# Patient Record
Sex: FEMALE
[De-identification: names, ages, dates, MRNs, and addresses within clinical notes are randomized per-mention and may not be internally consistent; named-entity substitution may affect disease eponyms.]

---

## 2018-10-29 PROBLEM — Z00.00 ENCOUNTER FOR PREVENTIVE HEALTH EXAMINATION: Status: ACTIVE | Noted: 2018-10-29

## 2019-01-10 ENCOUNTER — APPOINTMENT (OUTPATIENT)
Dept: ANTEPARTUM | Facility: CLINIC | Age: 29
End: 2019-01-10

## 2019-01-10 ENCOUNTER — ASOB RESULT (OUTPATIENT)
Age: 29
End: 2019-01-10

## 2019-01-10 ENCOUNTER — APPOINTMENT (OUTPATIENT)
Dept: OBGYN | Facility: CLINIC | Age: 29
End: 2019-01-10
Payer: OTHER GOVERNMENT

## 2019-01-10 PROCEDURE — 76805 OB US >/= 14 WKS SNGL FETUS: CPT

## 2019-03-21 ENCOUNTER — ASOB RESULT (OUTPATIENT)
Age: 29
End: 2019-03-21

## 2019-03-21 ENCOUNTER — APPOINTMENT (OUTPATIENT)
Dept: OBGYN | Facility: CLINIC | Age: 29
End: 2019-03-21
Payer: OTHER GOVERNMENT

## 2019-03-21 PROCEDURE — 76816 OB US FOLLOW-UP PER FETUS: CPT

## 2019-04-25 ENCOUNTER — APPOINTMENT (OUTPATIENT)
Dept: OBGYN | Facility: CLINIC | Age: 29
End: 2019-04-25
Payer: OTHER GOVERNMENT

## 2019-04-25 ENCOUNTER — ASOB RESULT (OUTPATIENT)
Age: 29
End: 2019-04-25

## 2019-04-25 PROCEDURE — 76816 OB US FOLLOW-UP PER FETUS: CPT

## 2019-04-29 ENCOUNTER — APPOINTMENT (OUTPATIENT)
Dept: MATERNAL FETAL MEDICINE | Facility: CLINIC | Age: 29
End: 2019-04-29
Payer: OTHER GOVERNMENT

## 2019-04-29 VITALS
SYSTOLIC BLOOD PRESSURE: 94 MMHG | HEIGHT: 61 IN | BODY MASS INDEX: 28.32 KG/M2 | WEIGHT: 150 LBS | DIASTOLIC BLOOD PRESSURE: 68 MMHG | HEART RATE: 70 BPM

## 2019-04-29 DIAGNOSIS — O43.199 OTHER MALFORMATION OF PLACENTA, UNSPECIFIED TRIMESTER: ICD-10-CM

## 2019-04-29 DIAGNOSIS — O99.013 ANEMIA COMPLICATING PREGNANCY, THIRD TRIMESTER: ICD-10-CM

## 2019-04-29 DIAGNOSIS — Z78.9 OTHER SPECIFIED HEALTH STATUS: ICD-10-CM

## 2019-04-29 PROCEDURE — 99242 OFF/OP CONSLTJ NEW/EST SF 20: CPT

## 2019-04-29 RX ORDER — FERROUS SULFATE 325(65) MG
325 (65 FE) TABLET ORAL DAILY
Refills: 0 | Status: ACTIVE | COMMUNITY
Start: 2019-04-29

## 2019-04-29 NOTE — DISCUSSION/SUMMARY
[FreeTextEntry1] : Kathy is a 29-year-old  at 38 weeks estimated gestational age breech presentation and discussion about external podalic version. Her obstetrical history significant for living 2006 liveborn female  weighing 5 lbs. 3 oz. delivered at 35 weeks by normal spontaneous vaginal delivery. Patient had  rupture of membranes with spontaneous onset of labor. Subsequent pregnancy in 2017 a liveborn male  weighing 5 lbs. 7 oz. delivered 38 weeks by normal spontaneous vaginal delivery no problems or complications with either of those pregnancies.\par \par A growth scan was performed on  which did reveal a single viable intrauterine gestation with the estimated fetal weight at 6 lbs. 14 oz. which is consistent with the 51st percentile. Breech presentation with a posterior placenta was seen and the amniotic fluid index was low normal at 8.67 cm. A limited ultrasound was performed in our office today which confirmed a breech presentation and low-normal amniotic fluid index was again noted. Vital signs today reveal a blood pressure of 94/68 and maternal weight is 150 pounds which is consistent with a BMI of 28.34KG.\par \par External podalic version was discussed. The overall success rate was quoted at approximately 70 %. The use of ultrasound evaluation prior to version to checked for proper amount of amniotic fluid and to look for nuchal cord presentation was discussed.  The use of terbutaline prior to version was also discussed. Possible problems or complications related to the procedure including placental abruption and fetal bradycardia were discussed with the possible need for emergent  section. The patient also understands that after the procedure is over a nonstress test will be performed prior to discharge. All of the above was discussed with the patient and all of her questions were answered. We will attempt to schedule the version for her sometime later this week.\par \par Her family, medical and surgical history is noncontributory. She has an allergy to penicillin-based medications and denies alcohol, tobacco or drug use.\par \par Recommendations;\par \par #1. We will attempt to schedule external podalic version at [facility] in the next 2 days.\par #2. Followup maternal fetal medicine consultation as clinically indicated.